# Patient Record
Sex: MALE | Race: WHITE | NOT HISPANIC OR LATINO | RURAL
[De-identification: names, ages, dates, MRNs, and addresses within clinical notes are randomized per-mention and may not be internally consistent; named-entity substitution may affect disease eponyms.]

---

## 2022-03-23 ENCOUNTER — CLAIMS CREATED FROM THE CLAIM WINDOW (OUTPATIENT)
Dept: RURAL CLINIC 8 | Facility: CLINIC | Age: 57
End: 2022-03-23
Payer: OTHER GOVERNMENT

## 2022-03-23 ENCOUNTER — OFFICE VISIT (OUTPATIENT)
Dept: RURAL CLINIC 8 | Facility: CLINIC | Age: 57
End: 2022-03-23
Payer: OTHER GOVERNMENT

## 2022-03-23 ENCOUNTER — DASHBOARD ENCOUNTERS (OUTPATIENT)
Age: 57
End: 2022-03-23

## 2022-03-23 DIAGNOSIS — Z86.010 HISTORY OF COLON POLYPS: ICD-10-CM

## 2022-03-23 PROCEDURE — 99202 OFFICE O/P NEW SF 15 MIN: CPT | Performed by: INTERNAL MEDICINE

## 2022-03-23 RX ORDER — SODIUM, POTASSIUM,MAG SULFATES 17.5-3.13G
177 ML SOLUTION, RECONSTITUTED, ORAL ORAL
Qty: 1 KIT | Refills: 0 | OUTPATIENT
Start: 2022-03-23

## 2022-03-23 RX ORDER — BISACODYL 5 MG
TAKE 4 TABLET, DELAYED RELEASE (ENTERIC COATED) ORAL
Qty: 4 | OUTPATIENT
Start: 2022-03-23 | End: 2022-03-24

## 2022-03-23 NOTE — HPI-TODAY'S VISIT:
in April of 2017 Dr. Rodríguez did this patient screening colonoscopy revealing 1 tubular adenoma.  He returns to the office today for follow-up. he is not having any vomiting blood in the stool or any other problems

## 2022-06-25 PROBLEM — 428283002: Status: ACTIVE | Noted: 2022-03-23

## 2022-07-12 ENCOUNTER — OFFICE VISIT (OUTPATIENT)
Dept: RURAL MEDICAL CENTER 2 | Facility: MEDICAL CENTER | Age: 57
End: 2022-07-12
Payer: OTHER GOVERNMENT

## 2022-07-12 DIAGNOSIS — Z86.010 ADENOMAS PERSONAL HISTORY OF COLONIC POLYPS: ICD-10-CM

## 2022-07-12 DIAGNOSIS — D12.5 ADENOMA OF SIGMOID COLON: ICD-10-CM

## 2022-07-12 DIAGNOSIS — D12.0 ADENOMA OF CECUM: ICD-10-CM

## 2022-07-12 DIAGNOSIS — D12.2 ADENOMA OF ASCENDING COLON: ICD-10-CM

## 2022-07-12 DIAGNOSIS — K60.3 ANAL FISTULA: ICD-10-CM

## 2022-07-12 PROCEDURE — 45385 COLONOSCOPY W/LESION REMOVAL: CPT | Performed by: INTERNAL MEDICINE

## 2025-08-11 ENCOUNTER — OFFICE VISIT (OUTPATIENT)
Dept: RURAL CLINIC 2 | Facility: CLINIC | Age: 60
End: 2025-08-11

## 2025-08-11 RX ORDER — CEFDINIR 300 MG/1
CAPSULE ORAL
Qty: 14 CAPSULE | Status: ACTIVE | COMMUNITY

## 2025-08-11 RX ORDER — HYDROCORTISONE ACETATE 25 MG/1
SUPPOSITORY RECTAL
Qty: 24 EACH | Refills: 0 | Status: ACTIVE | COMMUNITY

## 2025-08-11 RX ORDER — UREA 10 %
TAKE 1 TABLET BY MOUTH DAILY LOTION (ML) TOPICAL
Qty: 30 EACH | Refills: 1 | Status: ACTIVE | COMMUNITY

## 2025-08-11 RX ORDER — BUSPIRONE HYDROCHLORIDE 5 MG/1
TABLET ORAL
Qty: 60 TABLET | Status: ACTIVE | COMMUNITY

## 2025-08-11 RX ORDER — VALSARTAN 80 MG/1
TABLET ORAL
Qty: 90 EACH | Refills: 0 | Status: ACTIVE | COMMUNITY

## 2025-08-25 ENCOUNTER — OFFICE VISIT (OUTPATIENT)
Dept: RURAL CLINIC 2 | Facility: CLINIC | Age: 60
End: 2025-08-25
Payer: COMMERCIAL

## 2025-08-25 ENCOUNTER — LAB OUTSIDE AN ENCOUNTER (OUTPATIENT)
Dept: RURAL CLINIC 2 | Facility: CLINIC | Age: 60
End: 2025-08-25

## 2025-08-25 DIAGNOSIS — Z86.0101 PERSONAL HISTORY OF ADENOMATOUS AND SERRATED COLON POLYPS: ICD-10-CM

## 2025-08-25 DIAGNOSIS — Z09 CARDIOLOGY FOLLOW-UP ENCOUNTER: ICD-10-CM

## 2025-08-25 PROCEDURE — 99203 OFFICE O/P NEW LOW 30 MIN: CPT | Performed by: INTERNAL MEDICINE

## 2025-08-25 RX ORDER — UREA 10 %
TAKE 1 TABLET BY MOUTH DAILY LOTION (ML) TOPICAL
Qty: 30 EACH | Refills: 1 | Status: DISCONTINUED | COMMUNITY

## 2025-08-25 RX ORDER — SODIUM, POTASSIUM,MAG SULFATES 17.5-3.13G
177 ML SOLUTION, RECONSTITUTED, ORAL ORAL TWICE A DAY
Qty: 354 ML | Refills: 0 | OUTPATIENT
Start: 2025-08-25 | End: 2025-08-26

## 2025-08-25 RX ORDER — CEFDINIR 300 MG/1
CAPSULE ORAL
Qty: 14 CAPSULE | Status: DISCONTINUED | COMMUNITY

## 2025-08-25 RX ORDER — HYDROCORTISONE ACETATE 25 MG/1
1 SUPPOSITORY SUPPOSITORY RECTAL AS NEEDED
Refills: 0 | Status: ACTIVE | COMMUNITY

## 2025-08-25 RX ORDER — BUSPIRONE HYDROCHLORIDE 5 MG/1
1 TABLET TABLET ORAL AS NEEDED
Status: ACTIVE | COMMUNITY

## 2025-08-25 RX ORDER — VALSARTAN 80 MG/1
TABLET ORAL
Qty: 90 EACH | Refills: 0 | Status: DISCONTINUED | COMMUNITY